# Patient Record
Sex: FEMALE | Employment: UNEMPLOYED | URBAN - NONMETROPOLITAN AREA
[De-identification: names, ages, dates, MRNs, and addresses within clinical notes are randomized per-mention and may not be internally consistent; named-entity substitution may affect disease eponyms.]

---
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
## 2023-01-23 ENCOUNTER — OFFICE VISIT (OUTPATIENT)
Dept: DERMATOLOGY | Age: 18
End: 2023-01-23
Payer: COMMERCIAL

## 2023-01-23 VITALS
HEIGHT: 66 IN | BODY MASS INDEX: 36 KG/M2 | DIASTOLIC BLOOD PRESSURE: 68 MMHG | OXYGEN SATURATION: 98 % | HEART RATE: 81 BPM | WEIGHT: 224 LBS | SYSTOLIC BLOOD PRESSURE: 110 MMHG | RESPIRATION RATE: 16 BRPM

## 2023-01-23 DIAGNOSIS — R59.9 LYMPHOID HYPERPLASIA: Primary | ICD-10-CM

## 2023-01-23 PROCEDURE — 11900 INJECT SKIN LESIONS </W 7: CPT | Performed by: DERMATOLOGY

## 2023-01-23 NOTE — PROGRESS NOTES
Dermatology Procedure Note   [facility] [address] [facility] AND SKIN A DEPARTMENT OF [place] [address]  [address] [place] [address]  Dept: [phone]  Dept Fax: [phone]      Procedure Date: 1/23/2023  Procedure Time: 2:58 PM    Procedure Practitioner: Barby Williamson MD    Procedure: Intralesional Kenalog    Pre-Procedure Diagnosis:  lymphoid hyperplasia    Post-Procedure Diagnosis: Same as Pre-Procedure Diagnosis    Informed Consent: The procedure and its risks were explained including but not limited to pain, bleeding, infection, permanent scar, permanent pigment alteration and recurrence. Consent to proceed with the procedure was obtained from the patient or the parent by the practitioner    Time Out:  A time out was conducted immediately before starting the procedure that confirmed a final verification of the correct patient, correct procedure, and correct site. Procedure Details:  Intralesional Injection: After discussion of risks including atrophy and dyspigmentation, patient gives verbal consent to proceed.  After cleansing with alcohol the  lymphoid hyperplasia  on the right nasal ala were injected with 0.1 ml of Kenalog 2.5 mg/mL      Procedure Performed By: Barby Williamson MD    Estimated Blood Loss: Minimal    Pathologic Specimen: none sent    Procedure Tolerance: Good    Complication(s): None    Electronically signed by Barby Williamson MD on 1/23/23 at 2:58 PM EST

## 2023-02-27 ENCOUNTER — OFFICE VISIT (OUTPATIENT)
Dept: DERMATOLOGY | Age: 18
End: 2023-02-27
Payer: COMMERCIAL

## 2023-02-27 VITALS
SYSTOLIC BLOOD PRESSURE: 110 MMHG | HEIGHT: 66 IN | WEIGHT: 222 LBS | HEART RATE: 93 BPM | RESPIRATION RATE: 14 BRPM | OXYGEN SATURATION: 96 % | DIASTOLIC BLOOD PRESSURE: 80 MMHG | BODY MASS INDEX: 35.68 KG/M2

## 2023-02-27 DIAGNOSIS — R59.9 LYMPHOID HYPERPLASIA: Primary | ICD-10-CM

## 2023-02-27 DIAGNOSIS — L72.0 MILIAL CYST: ICD-10-CM

## 2023-02-27 PROCEDURE — 1036F TOBACCO NON-USER: CPT | Performed by: DERMATOLOGY

## 2023-02-27 PROCEDURE — G8484 FLU IMMUNIZE NO ADMIN: HCPCS | Performed by: DERMATOLOGY

## 2023-02-27 PROCEDURE — G8417 CALC BMI ABV UP PARAM F/U: HCPCS | Performed by: DERMATOLOGY

## 2023-02-27 PROCEDURE — 99212 OFFICE O/P EST SF 10 MIN: CPT | Performed by: DERMATOLOGY

## 2023-02-27 PROCEDURE — G8427 DOCREV CUR MEDS BY ELIG CLIN: HCPCS | Performed by: DERMATOLOGY

## 2023-02-27 NOTE — PROGRESS NOTES
Dermatology Patient Note  [facility] [address] [facility]  [address]  [address] [address]  Dept: [phone]  Dept Fax: [phone]      VISITDATE: 2/27/2023   REFERRING PROVIDER: Carlos Linder MD      Shanae Nguyen is a 21 y.o. female  who presents today in the office for:    Skin Lesion (Skin lesion on nose)      HISTORY OF PRESENT ILLNESS:  Patient presents for follow up of biopsy-confirmed lymphoid hyperplasia. Patient had punch biopsy 11/28/2022 on right nasal ala. Has had two treatments of ILK 2.5 mg/ml    MEDICAL PROBLEMS:  There are no problems to display for this patient. CURRENT MEDICATIONS:   No current outpatient medications on file. No current facility-administered medications for this visit. ALLERGIES:   No Known Allergies    SOCIAL HISTORY:  Social History     Tobacco Use    Smoking status: Never    Smokeless tobacco: Never   Substance Use Topics    Alcohol use: Never       Pertinent ROS:  Review of Systems  Skin: Denies any new changing, growing or bleeding lesions or rashes except as described in the HPI   Constitutional: Denies fevers, chills, and malaise. PHYSICAL EXAM:   /80   Pulse 93   Resp 14   Ht 5' 6\" (1.676 m)   Wt 222 lb (100.7 kg)   SpO2 96%   BMI 35.83 kg/m²     The patient is generally well appearing, well nourished, alert and conversational. Affect is normal.    Cutaneous Exam:  Physical Exam  Face and neck only was examined. Facial covering was removed during examination. Diagnoses/exam findings/medical history pertinent to this visit are listed below:    Assessment:   Diagnosis Orders   1. Lymphoid hyperplasia        2.  Milial cyst             Plan:  Lymphoid hyperplasia of right nasal ala  - mostly resolved with some visible sebaceous glands/milia likely due to steroid  - will defer further ILK, call if bump recurs    Milia cysts of face, acne vulgaris  - reassurance and education   - offered tretinoin cream, patient declined     RTC  prn     No future appointments. There are no Patient Instructions on file for this visit. Apolinar Thomas, personally scribed the services dictated to me by Dr. Ish Allen in this documentation. I, Dr. Ish Allen, personally performed the services described in this documentation, as scribed by [facility] in my presence, and it is both accurate and complete.     Electronically signed by Jovani Loaiza MD on 2/27/2023 at 2:57 PM

## 2024-07-13 ENCOUNTER — OFFICE VISIT (OUTPATIENT)
Dept: PRIMARY CARE CLINIC | Age: 19
End: 2024-07-13
Payer: COMMERCIAL

## 2024-07-13 VITALS
BODY MASS INDEX: 40.19 KG/M2 | OXYGEN SATURATION: 98 % | HEART RATE: 55 BPM | DIASTOLIC BLOOD PRESSURE: 78 MMHG | SYSTOLIC BLOOD PRESSURE: 100 MMHG | WEIGHT: 249 LBS | TEMPERATURE: 97.2 F

## 2024-07-13 DIAGNOSIS — H66.92 LEFT OTITIS MEDIA, UNSPECIFIED OTITIS MEDIA TYPE: Primary | ICD-10-CM

## 2024-07-13 PROCEDURE — G8427 DOCREV CUR MEDS BY ELIG CLIN: HCPCS | Performed by: FAMILY MEDICINE

## 2024-07-13 PROCEDURE — 1036F TOBACCO NON-USER: CPT | Performed by: FAMILY MEDICINE

## 2024-07-13 PROCEDURE — G8417 CALC BMI ABV UP PARAM F/U: HCPCS | Performed by: FAMILY MEDICINE

## 2024-07-13 PROCEDURE — 99203 OFFICE O/P NEW LOW 30 MIN: CPT | Performed by: FAMILY MEDICINE

## 2024-07-13 RX ORDER — AMOXICILLIN 875 MG/1
875 TABLET, COATED ORAL 2 TIMES DAILY
Qty: 20 TABLET | Refills: 0 | Status: SHIPPED | OUTPATIENT
Start: 2024-07-13 | End: 2024-07-23

## 2024-07-13 NOTE — PROGRESS NOTES
[place] [address]                        Telephone [phone]             Fax [phone]       Lida Hickman  :  2005  Age:  19 y.o.   MRN:  [id number]  Date of visit:  2024       Assessment & Plan:    Left otitis media, unspecified otitis media type  - amoxicillin (AMOXIL) 875 MG tablet; Take 1 tablet by mouth 2 times daily for 10 days  Dispense: 20 tablet; Refill: 0    She was advised to follow up if symptoms worsen or do not resolve.         Subjective:    Lida Hickman is a 19 y.o. female who presents to [facility] today (2024) for evaluation of:  Otalgia (Left ear for 3 days)      She reports pain in the left ear for 3 days.   She denies fever.   She denies drainage.   She denies sinus symptoms, sore throat, or cough.      She does not take any prescription medications currently.        She has No Known Allergies.    She has no significant past medical history.     She  reports that she has never smoked. She has never used smokeless tobacco.      Objective:    Vitals:    24 1045   BP: 100/78   Pulse: 55   Temp: 97.2 °F (36.2 °C)   TempSrc: Tympanic   SpO2: 98%   Weight: 112.9 kg (249 lb)      SpO2: 98 %       Body mass index is 40.19 kg/m².    Well-nourished, well-developed female with severe obesity, healthy-appearing, alert, and cooperative.  The right tympanic membrane and external auditory canal are clear.  The left external auditory canal is clear.  The left tympanic membrane is erythematous and dull.  Oropharynx has no erythema.  There is no exudate.  There is no tenderness over the frontal and maxillary sinuses bilaterally.  Neck supple. No adenopathy.  Chest:  Normal expansion.  Clear to auscultation.  No rales, rhonchi, or wheezing.  Respirations are not labored.   Heart sounds are normal.  Regular rate and rhythm without murmur, gallop or

## 2024-08-23 ENCOUNTER — HOSPITAL ENCOUNTER (EMERGENCY)
Age: 19
Discharge: HOME | End: 2024-08-23
Payer: COMMERCIAL

## 2024-08-23 VITALS
SYSTOLIC BLOOD PRESSURE: 136 MMHG | OXYGEN SATURATION: 97 % | HEART RATE: 83 BPM | DIASTOLIC BLOOD PRESSURE: 78 MMHG | RESPIRATION RATE: 18 BRPM | TEMPERATURE: 97.8 F

## 2024-08-23 DIAGNOSIS — H65.01 NON-RECURRENT ACUTE SEROUS OTITIS MEDIA OF RIGHT EAR: Primary | ICD-10-CM

## 2024-08-23 PROCEDURE — 99213 OFFICE O/P EST LOW 20 MIN: CPT

## 2024-08-23 ASSESSMENT — PAIN SCALES - GENERAL: PAINLEVEL_OUTOF10: 7

## 2024-08-23 ASSESSMENT — ENCOUNTER SYMPTOMS
RESPIRATORY NEGATIVE: 1
EYES NEGATIVE: 1
GASTROINTESTINAL NEGATIVE: 1
ALLERGIC/IMMUNOLOGIC NEGATIVE: 1
SINUS PRESSURE: 1

## 2024-08-23 ASSESSMENT — PAIN DESCRIPTION - LOCATION: LOCATION: EAR;HEAD

## 2024-08-23 ASSESSMENT — PAIN - FUNCTIONAL ASSESSMENT: PAIN_FUNCTIONAL_ASSESSMENT: 0-10

## 2024-08-23 NOTE — ED PROVIDER NOTES
[facility] URGENT CARE  Urgent Care Encounter       CHIEF COMPLAINT       Chief Complaint   Patient presents with    Otalgia       Nurses Notes reviewed and I agree except as noted in the HPI.  HISTORY OF PRESENT ILLNESS   Lida Hickman is a 19 y.o. female who presents to urgent care for right ear pain, nasal congestion and sore throat.  Symptoms started 3 days ago.  States has taken over-the-counter DayQuil and NyQuil with no relief.  Denies fever.  Patient states just got over the left otitis media a month ago.    To urgent    The history is provided by the patient. No  was used.       REVIEW OF SYSTEMS     Review of Systems   Constitutional:  Negative for fever.   HENT:  Positive for congestion, ear pain (right) and sinus pressure.    Eyes: Negative.    Respiratory: Negative.     Cardiovascular: Negative.    Gastrointestinal: Negative.    Endocrine: Negative.    Genitourinary: Negative.    Musculoskeletal: Negative.    Skin: Negative.    Allergic/Immunologic: Negative.    Neurological: Negative.    Hematological: Negative.    Psychiatric/Behavioral: Negative.         PAST MEDICAL HISTORY   History reviewed. No pertinent past medical history.    SURGICALHISTORY     Patient  has a past surgical history that includes [place] tooth extraction.    CURRENT MEDICATIONS       Discharge Medication List as of 8/23/2024  6:21 PM          ALLERGIES     Patient is has No Known Allergies.    Patients   There is no immunization history on file for this patient.    FAMILY HISTORY     Patient's family history includes No Known Problems in her father and mother.    SOCIAL HISTORY     Patient  reports that she has never smoked. She has never used smokeless tobacco. She reports that she does not drink alcohol and does not use drugs.    PHYSICAL EXAM     ED TRIAGE VITALS  BP: 136/78, Temp: 97.8 °F (36.6 °C), Pulse: 83, Respirations: 18, SpO2: 97 %,Estimated body mass index is 40.19 kg/m² as  that she was just on amoxicillin a month ago.  Discussed taking over-the-counter Zyrtec, Flonase, and Mucinex for congestion.  Can take over-the-counter Motrin or Tylenol as needed for pain.  Follow-up with family doctor in 3 to 5 days.  Return for any new or worsening symptoms.  Patient agreed to above treatment plan all questions answered.      PATIENT REFERRED TO:  Abby Bojorquez APRN - NP  None      DISCHARGE MEDICATIONS:  Discharge Medication List as of 8/23/2024  6:21 PM        START taking these medications    Details   amoxicillin-clavulanate (AUGMENTIN) 875-125 MG per tablet Take 1 tablet by mouth 2 times daily for 7 days, Disp-14 tablet, R-0Normal             Discharge Medication List as of 8/23/2024  6:21 PM          Discharge Medication List as of 8/23/2024  6:21 PM          AMERICA Kay CNP    (Please note that portions of this note were completed with a voice recognition program. Efforts were made to edit the dictations but occasionally words are mis-transcribed.)          Franny Swift APRN - CNP  08/23/24 1710

## 2024-12-18 ENCOUNTER — OFFICE VISIT (OUTPATIENT)
Dept: PRIMARY CARE CLINIC | Age: 19
End: 2024-12-18
Payer: COMMERCIAL

## 2024-12-18 VITALS
HEART RATE: 93 BPM | OXYGEN SATURATION: 96 % | WEIGHT: 241 LBS | DIASTOLIC BLOOD PRESSURE: 70 MMHG | BODY MASS INDEX: 38.9 KG/M2 | SYSTOLIC BLOOD PRESSURE: 118 MMHG | TEMPERATURE: 100.5 F

## 2024-12-18 DIAGNOSIS — R05.9 COUGH, UNSPECIFIED TYPE: ICD-10-CM

## 2024-12-18 DIAGNOSIS — J10.1 INFLUENZA A: Primary | ICD-10-CM

## 2024-12-18 LAB
INFLUENZA A ANTIGEN, POC: POSITIVE
INFLUENZA B ANTIGEN, POC: NEGATIVE
LOT EXPIRE DATE: ABNORMAL
LOT KIT NUMBER: ABNORMAL
SARS-COV-2, POC: ABNORMAL
VALID INTERNAL CONTROL: ABNORMAL
VENDOR AND KIT NAME POC: ABNORMAL

## 2024-12-18 PROCEDURE — G8484 FLU IMMUNIZE NO ADMIN: HCPCS

## 2024-12-18 PROCEDURE — G8417 CALC BMI ABV UP PARAM F/U: HCPCS

## 2024-12-18 PROCEDURE — G8427 DOCREV CUR MEDS BY ELIG CLIN: HCPCS

## 2024-12-18 PROCEDURE — 99213 OFFICE O/P EST LOW 20 MIN: CPT

## 2024-12-18 PROCEDURE — 87428 SARSCOV & INF VIR A&B AG IA: CPT

## 2024-12-18 PROCEDURE — 1036F TOBACCO NON-USER: CPT

## 2024-12-18 ASSESSMENT — ENCOUNTER SYMPTOMS
SINUS PAIN: 1
COUGH: 1
RHINORRHEA: 1
DIARRHEA: 0
VOMITING: 0
SINUS PRESSURE: 1
NAUSEA: 1
CONSTIPATION: 0
FLU SYMPTOMS: 1
SHORTNESS OF BREATH: 0
SORE THROAT: 1

## 2024-12-18 NOTE — PATIENT INSTRUCTIONS
Patient has signs and symptoms of upper respiratory infection / viral illness.   Patient is afebrile and stable.   May be treated with over the counter medications. (Sudafed, cold/ sinus)  If high blood pressure may use Corcidin OTC cold medications  Patient can use Tylenol and/or OTC cough syrup.   Antibiotics are not indicated at the present time.   Advised to follow up with family doctor or return to urgent care if does not get better or symptoms worsen.   Pt can go to ER if high fever >102 , vomiting, breathing difficulty, lethargy.   Patient understands this approach of home management and agrees with it.

## 2024-12-18 NOTE — PROGRESS NOTES
[facility]  [address]  [facility] [address]  Dept: [phone]  Dept Fax: [phone]    Lida Hickman  is a 19 y.o. female who presents today for her medical conditions/complaints as noted below.  Lida Hickman is c/o of   Chief Complaint   Patient presents with    Cough     Cough, fever, ba, nasal drainage started last night        HPI:     Influenza  This is a new problem. The current episode started yesterday. The problem occurs constantly. The problem has been gradually worsening. Associated symptoms include congestion, coughing, fatigue, a fever (tmax 101.2), headaches, nausea and a sore throat. Pertinent negatives include no vomiting. Nothing aggravates the symptoms. She has tried acetaminophen for the symptoms. The treatment provided mild relief.         History reviewed. No pertinent past medical history.  Past Surgical History:   Procedure Laterality Date    WISDOM TOOTH EXTRACTION         Family History   Problem Relation Age of Onset    No Known Problems Mother     No Known Problems Father        Social History     Tobacco Use    Smoking status: Never    Smokeless tobacco: Never   Substance Use Topics    Alcohol use: Never      Prior to Visit Medications    Not on File     No Known Allergies    Subjective:      Review of Systems   Constitutional:  Positive for fatigue and fever (tmax 101.2). Negative for activity change and appetite change.   HENT:  Positive for congestion, postnasal drip, rhinorrhea, sinus pressure, sinus pain and sore throat.    Respiratory:  Positive for cough. Negative for shortness of breath.    Gastrointestinal:  Positive for nausea. Negative for constipation, diarrhea and vomiting.   Neurological:  Positive for headaches.       Objective:     Physical Exam  Vitals and nursing note reviewed.   Constitutional:       General: She is not in acute